# Patient Record
Sex: MALE | Race: WHITE | Employment: FULL TIME | URBAN - METROPOLITAN AREA
[De-identification: names, ages, dates, MRNs, and addresses within clinical notes are randomized per-mention and may not be internally consistent; named-entity substitution may affect disease eponyms.]

---

## 2017-04-24 ENCOUNTER — HOSPITAL ENCOUNTER (EMERGENCY)
Age: 47
Discharge: HOME | End: 2017-04-25
Attending: EMERGENCY MEDICINE
Payer: COMMERCIAL

## 2017-04-24 VITALS
BODY MASS INDEX: 30.48 KG/M2 | HEART RATE: 73 BPM | HEIGHT: 72 IN | SYSTOLIC BLOOD PRESSURE: 157 MMHG | TEMPERATURE: 97.5 F | OXYGEN SATURATION: 97 % | WEIGHT: 225 LBS | RESPIRATION RATE: 16 BRPM | DIASTOLIC BLOOD PRESSURE: 96 MMHG

## 2017-04-24 DIAGNOSIS — R10.31 ABDOMINAL PAIN, RIGHT LOWER QUADRANT: Primary | ICD-10-CM

## 2017-04-24 LAB
ALBUMIN SERPL BCP-MCNC: 4.1 G/DL (ref 3.5–5)
ALBUMIN/GLOB SERPL: 1.1 {RATIO} (ref 1.2–3.5)
ALP SERPL-CCNC: 103 U/L (ref 50–136)
ALT SERPL-CCNC: 41 U/L (ref 12–65)
ANION GAP BLD CALC-SCNC: 7 MMOL/L (ref 7–16)
AST SERPL W P-5'-P-CCNC: 17 U/L (ref 15–37)
BASOPHILS # BLD AUTO: 0.1 K/UL (ref 0–0.2)
BASOPHILS # BLD: 1 % (ref 0–2)
BILIRUB SERPL-MCNC: 0.3 MG/DL (ref 0.2–1.1)
BUN SERPL-MCNC: 18 MG/DL (ref 6–23)
CALCIUM SERPL-MCNC: 8.8 MG/DL (ref 8.3–10.4)
CHLORIDE SERPL-SCNC: 106 MMOL/L (ref 98–107)
CO2 SERPL-SCNC: 31 MMOL/L (ref 21–32)
CREAT SERPL-MCNC: 1.24 MG/DL (ref 0.8–1.5)
DIFFERENTIAL METHOD BLD: ABNORMAL
EOSINOPHIL # BLD: 0.4 K/UL (ref 0–0.8)
EOSINOPHIL NFR BLD: 4 % (ref 0.5–7.8)
ERYTHROCYTE [DISTWIDTH] IN BLOOD BY AUTOMATED COUNT: 12.9 % (ref 11.9–14.6)
GLOBULIN SER CALC-MCNC: 3.6 G/DL (ref 2.3–3.5)
GLUCOSE SERPL-MCNC: 129 MG/DL (ref 65–100)
HCT VFR BLD AUTO: 47.2 % (ref 41.1–50.3)
HGB BLD-MCNC: 16.2 G/DL (ref 13.6–17.2)
IMM GRANULOCYTES # BLD: 0 K/UL (ref 0–0.5)
IMM GRANULOCYTES NFR BLD AUTO: 0.2 % (ref 0–5)
LYMPHOCYTES # BLD AUTO: 30 % (ref 13–44)
LYMPHOCYTES # BLD: 2.6 K/UL (ref 0.5–4.6)
MCH RBC QN AUTO: 30.9 PG (ref 26.1–32.9)
MCHC RBC AUTO-ENTMCNC: 34.3 G/DL (ref 31.4–35)
MCV RBC AUTO: 89.9 FL (ref 79.6–97.8)
MONOCYTES # BLD: 0.6 K/UL (ref 0.1–1.3)
MONOCYTES NFR BLD AUTO: 6 % (ref 4–12)
NEUTS SEG # BLD: 5.2 K/UL (ref 1.7–8.2)
NEUTS SEG NFR BLD AUTO: 59 % (ref 43–78)
PLATELET # BLD AUTO: 255 K/UL (ref 150–450)
PMV BLD AUTO: 10.5 FL (ref 10.8–14.1)
POTASSIUM SERPL-SCNC: 3.9 MMOL/L (ref 3.5–5.1)
PROT SERPL-MCNC: 7.7 G/DL (ref 6.3–8.2)
RBC # BLD AUTO: 5.25 M/UL (ref 4.23–5.67)
SODIUM SERPL-SCNC: 144 MMOL/L (ref 136–145)
WBC # BLD AUTO: 8.8 K/UL (ref 4.3–11.1)

## 2017-04-24 PROCEDURE — 96375 TX/PRO/DX INJ NEW DRUG ADDON: CPT | Performed by: EMERGENCY MEDICINE

## 2017-04-24 PROCEDURE — 96374 THER/PROPH/DIAG INJ IV PUSH: CPT | Performed by: EMERGENCY MEDICINE

## 2017-04-24 PROCEDURE — 81003 URINALYSIS AUTO W/O SCOPE: CPT | Performed by: EMERGENCY MEDICINE

## 2017-04-24 PROCEDURE — 74011250636 HC RX REV CODE- 250/636: Performed by: EMERGENCY MEDICINE

## 2017-04-24 PROCEDURE — 99283 EMERGENCY DEPT VISIT LOW MDM: CPT | Performed by: EMERGENCY MEDICINE

## 2017-04-24 PROCEDURE — 85025 COMPLETE CBC W/AUTO DIFF WBC: CPT | Performed by: EMERGENCY MEDICINE

## 2017-04-24 PROCEDURE — 80053 COMPREHEN METABOLIC PANEL: CPT | Performed by: EMERGENCY MEDICINE

## 2017-04-24 RX ORDER — KETOROLAC TROMETHAMINE 30 MG/ML
30 INJECTION, SOLUTION INTRAMUSCULAR; INTRAVENOUS
Status: COMPLETED | OUTPATIENT
Start: 2017-04-24 | End: 2017-04-24

## 2017-04-24 RX ORDER — ONDANSETRON 2 MG/ML
4 INJECTION INTRAMUSCULAR; INTRAVENOUS
Status: COMPLETED | OUTPATIENT
Start: 2017-04-24 | End: 2017-04-24

## 2017-04-24 RX ADMIN — KETOROLAC TROMETHAMINE 30 MG: 30 INJECTION, SOLUTION INTRAMUSCULAR; INTRAVENOUS at 22:21

## 2017-04-24 RX ADMIN — ONDANSETRON 4 MG: 2 INJECTION, SOLUTION INTRAMUSCULAR; INTRAVENOUS at 22:21

## 2017-04-25 ENCOUNTER — APPOINTMENT (OUTPATIENT)
Dept: CT IMAGING | Age: 47
End: 2017-04-25
Attending: EMERGENCY MEDICINE
Payer: COMMERCIAL

## 2017-04-25 PROCEDURE — 74176 CT ABD & PELVIS W/O CONTRAST: CPT

## 2017-04-25 NOTE — ED TRIAGE NOTES
Patient states that he has right flank pain starting about 30 minutes. Patient states that he had this same pain a few weeks ago. Patient states that he can go to the bathroom but the pain was radiating to his back.

## 2017-04-25 NOTE — DISCHARGE INSTRUCTIONS

## 2017-04-25 NOTE — ED PROVIDER NOTES
HPI Comments: Patient had sudden onset of right lower quadrant pain approximately 30 minutes prior to arrival.  He describes it as achy pain and occasionally seems to radiate to his back. He denies any aggravating or alleviating factors though he says it does feel slightly better when he pushes on the area. He denies any urinary symptoms or fever or vomiting. He did have some nausea earlier. He states 2 weeks ago he felt like he passed a kidney stone though he has never had a history of kidney stones before. Elements of this note were created using speech recognition software. As such, errors of speech recognition may be present. Patient is a 48 y.o. male presenting with flank pain. The history is provided by the patient. Flank Pain    Pertinent negatives include no fever. Past Medical History:   Diagnosis Date    GERD (gastroesophageal reflux disease)     rare takes an occassional tums    History of staph infection right leg   6 months ago    tx with oral antibiotics    Hypertension     medication not being taken at present, patient stopped    Obesity (BMI 41-93.6) 64.8    Umbilical hernia        Past Surgical History:   Procedure Laterality Date    HX CARPAL TUNNEL RELEASE Right     HX HEENT      wisdom teeth ext    HX HERNIA REPAIR       umbilical 2096    HX HERNIA REPAIR Bilateral     HX ORTHOPAEDIC Right 1986    right carpal tunnel    HX WISDOM TEETH EXTRACTION           History reviewed. No pertinent family history. Social History     Social History    Marital status:      Spouse name: N/A    Number of children: N/A    Years of education: N/A     Occupational History    Not on file.      Social History Main Topics    Smoking status: Never Smoker    Smokeless tobacco: Current User    Alcohol use Yes      Comment: social    Drug use: No    Sexual activity: Not on file     Other Topics Concern    Not on file     Social History Narrative         ALLERGIES: Colleen Thompson [hydrocodone-acetaminophen]    Review of Systems   Constitutional: Negative for chills and fever. Gastrointestinal: Negative for nausea and vomiting. Genitourinary: Positive for flank pain. All other systems reviewed and are negative. Vitals:    04/24/17 2210   BP: (!) 157/96   Pulse: 73   Resp: 16   Temp: 97.5 °F (36.4 °C)   SpO2: 97%   Weight: 102.1 kg (225 lb)   Height: 6' (1.829 m)            Physical Exam   Constitutional: He is oriented to person, place, and time. He appears well-developed and well-nourished. HENT:   Head: Normocephalic and atraumatic. Eyes: Conjunctivae are normal. Pupils are equal, round, and reactive to light. Neck: Normal range of motion. Neck supple. Abdominal: Soft. Bowel sounds are normal. He exhibits no distension. There is no tenderness. Musculoskeletal: He exhibits no edema or tenderness. Neurological: He is alert and oriented to person, place, and time. Skin: Skin is warm and dry. Psychiatric: He has a normal mood and affect. His behavior is normal.   Nursing note and vitals reviewed. MDM  Number of Diagnoses or Management Options  Abdominal pain, right lower quadrant: new and does not require workup  Diagnosis management comments: differential diagnosis: renal colic, appendicitis, colitis, urinary tract infection  1:47 AM discussed results with patient, and normal CT findings.   Repeat abdominal exam is unremarkable and he appears comfortable       Amount and/or Complexity of Data Reviewed  Clinical lab tests: ordered and reviewed  Tests in the radiology section of CPT®: ordered and reviewed  Independent visualization of images, tracings, or specimens: yes    Risk of Complications, Morbidity, and/or Mortality  Presenting problems: moderate  Diagnostic procedures: moderate  Management options: moderate    Patient Progress  Patient progress: stable    ED Course       Procedures

## 2020-01-13 ENCOUNTER — APPOINTMENT (OUTPATIENT)
Dept: GENERAL RADIOLOGY | Age: 50
End: 2020-01-13
Attending: EMERGENCY MEDICINE
Payer: COMMERCIAL

## 2020-01-13 ENCOUNTER — HOSPITAL ENCOUNTER (EMERGENCY)
Age: 50
Discharge: HOME | End: 2020-01-14
Attending: EMERGENCY MEDICINE
Payer: COMMERCIAL

## 2020-01-13 DIAGNOSIS — R00.0 SINUS TACHYCARDIA: Primary | ICD-10-CM

## 2020-01-13 DIAGNOSIS — I10 HYPERTENSION, UNSPECIFIED TYPE: ICD-10-CM

## 2020-01-13 LAB
ALBUMIN SERPL-MCNC: 3.6 G/DL (ref 3.5–5)
ALBUMIN/GLOB SERPL: 1 {RATIO} (ref 1.2–3.5)
ALP SERPL-CCNC: 118 U/L (ref 50–136)
ALT SERPL-CCNC: 36 U/L (ref 12–65)
ANION GAP SERPL CALC-SCNC: 7 MMOL/L (ref 7–16)
AST SERPL-CCNC: 20 U/L (ref 15–37)
BACTERIA URNS QL MICRO: 0 /HPF
BASOPHILS # BLD: 0.1 K/UL (ref 0–0.2)
BASOPHILS NFR BLD: 1 % (ref 0–2)
BILIRUB SERPL-MCNC: 0.2 MG/DL (ref 0.2–1.1)
BUN SERPL-MCNC: 13 MG/DL (ref 6–23)
CALCIUM SERPL-MCNC: 8.9 MG/DL (ref 8.3–10.4)
CASTS URNS QL MICRO: NORMAL /LPF
CHLORIDE SERPL-SCNC: 109 MMOL/L (ref 98–107)
CO2 SERPL-SCNC: 24 MMOL/L (ref 21–32)
CREAT SERPL-MCNC: 1.35 MG/DL (ref 0.8–1.5)
D DIMER PPP FEU-MCNC: <0.27 UG/ML(FEU)
DIFFERENTIAL METHOD BLD: NORMAL
EOSINOPHIL # BLD: 0.3 K/UL (ref 0–0.8)
EOSINOPHIL NFR BLD: 4 % (ref 0.5–7.8)
EPI CELLS #/AREA URNS HPF: NORMAL /HPF
ERYTHROCYTE [DISTWIDTH] IN BLOOD BY AUTOMATED COUNT: 12.3 % (ref 11.9–14.6)
GLOBULIN SER CALC-MCNC: 3.5 G/DL (ref 2.3–3.5)
GLUCOSE SERPL-MCNC: 157 MG/DL (ref 65–100)
HCT VFR BLD AUTO: 45.4 % (ref 41.1–50.3)
HGB BLD-MCNC: 15.4 G/DL (ref 13.6–17.2)
IMM GRANULOCYTES # BLD AUTO: 0 K/UL (ref 0–0.5)
IMM GRANULOCYTES NFR BLD AUTO: 1 % (ref 0–5)
LYMPHOCYTES # BLD: 2 K/UL (ref 0.5–4.6)
LYMPHOCYTES NFR BLD: 24 % (ref 13–44)
MCH RBC QN AUTO: 30.3 PG (ref 26.1–32.9)
MCHC RBC AUTO-ENTMCNC: 33.9 G/DL (ref 31.4–35)
MCV RBC AUTO: 89.4 FL (ref 79.6–97.8)
MONOCYTES # BLD: 0.7 K/UL (ref 0.1–1.3)
MONOCYTES NFR BLD: 8 % (ref 4–12)
NEUTS SEG # BLD: 5.1 K/UL (ref 1.7–8.2)
NEUTS SEG NFR BLD: 63 % (ref 43–78)
NRBC # BLD: 0 K/UL (ref 0–0.2)
PLATELET # BLD AUTO: 270 K/UL (ref 150–450)
PMV BLD AUTO: 9.7 FL (ref 9.4–12.3)
POTASSIUM SERPL-SCNC: 3.4 MMOL/L (ref 3.5–5.1)
PROT SERPL-MCNC: 7.1 G/DL (ref 6.3–8.2)
RBC # BLD AUTO: 5.08 M/UL (ref 4.23–5.6)
RBC #/AREA URNS HPF: 0 /HPF
SODIUM SERPL-SCNC: 140 MMOL/L (ref 136–145)
TROPONIN I BLD-MCNC: 0 NG/ML (ref 0.02–0.05)
WBC # BLD AUTO: 8.2 K/UL (ref 4.3–11.1)
WBC URNS QL MICRO: NORMAL /HPF

## 2020-01-13 PROCEDURE — 71045 X-RAY EXAM CHEST 1 VIEW: CPT

## 2020-01-13 PROCEDURE — 81003 URINALYSIS AUTO W/O SCOPE: CPT | Performed by: EMERGENCY MEDICINE

## 2020-01-13 PROCEDURE — 74011000250 HC RX REV CODE- 250: Performed by: EMERGENCY MEDICINE

## 2020-01-13 PROCEDURE — 74011250636 HC RX REV CODE- 250/636: Performed by: EMERGENCY MEDICINE

## 2020-01-13 PROCEDURE — 93005 ELECTROCARDIOGRAM TRACING: CPT | Performed by: EMERGENCY MEDICINE

## 2020-01-13 PROCEDURE — 84484 ASSAY OF TROPONIN QUANT: CPT

## 2020-01-13 PROCEDURE — 81015 MICROSCOPIC EXAM OF URINE: CPT

## 2020-01-13 PROCEDURE — 85379 FIBRIN DEGRADATION QUANT: CPT

## 2020-01-13 PROCEDURE — 85025 COMPLETE CBC W/AUTO DIFF WBC: CPT

## 2020-01-13 PROCEDURE — 99285 EMERGENCY DEPT VISIT HI MDM: CPT | Performed by: EMERGENCY MEDICINE

## 2020-01-13 PROCEDURE — 80053 COMPREHEN METABOLIC PANEL: CPT

## 2020-01-13 PROCEDURE — 96374 THER/PROPH/DIAG INJ IV PUSH: CPT | Performed by: EMERGENCY MEDICINE

## 2020-01-13 RX ORDER — SODIUM CHLORIDE 9 MG/ML
1000 INJECTION, SOLUTION INTRAVENOUS ONCE
Status: COMPLETED | OUTPATIENT
Start: 2020-01-13 | End: 2020-01-14

## 2020-01-13 RX ORDER — METOPROLOL TARTRATE 5 MG/5ML
5 INJECTION INTRAVENOUS
Status: DISPENSED | OUTPATIENT
Start: 2020-01-13 | End: 2020-01-13

## 2020-01-13 RX ADMIN — METOPROLOL TARTRATE 5 MG: 5 INJECTION INTRAVENOUS at 23:11

## 2020-01-13 RX ADMIN — SODIUM CHLORIDE 1000 ML: 900 INJECTION, SOLUTION INTRAVENOUS at 22:56

## 2020-01-13 RX ADMIN — METOPROLOL TARTRATE 5 MG: 5 INJECTION INTRAVENOUS at 23:32

## 2020-01-14 VITALS
OXYGEN SATURATION: 96 % | HEIGHT: 73 IN | SYSTOLIC BLOOD PRESSURE: 143 MMHG | WEIGHT: 228 LBS | RESPIRATION RATE: 23 BRPM | HEART RATE: 87 BPM | TEMPERATURE: 98.5 F | BODY MASS INDEX: 30.22 KG/M2 | DIASTOLIC BLOOD PRESSURE: 92 MMHG

## 2020-01-14 LAB
ATRIAL RATE: 119 BPM
CALCULATED P AXIS, ECG09: 47 DEGREES
CALCULATED R AXIS, ECG10: 48 DEGREES
CALCULATED T AXIS, ECG11: 33 DEGREES
DIAGNOSIS, 93000: NORMAL
P-R INTERVAL, ECG05: 160 MS
Q-T INTERVAL, ECG07: 318 MS
QRS DURATION, ECG06: 100 MS
QTC CALCULATION (BEZET), ECG08: 447 MS
TROPONIN I SERPL-MCNC: <0.02 NG/ML (ref 0.02–0.05)
TSH SERPL DL<=0.005 MIU/L-ACNC: 3.39 UIU/ML
VENTRICULAR RATE, ECG03: 119 BPM

## 2020-01-14 PROCEDURE — 84443 ASSAY THYROID STIM HORMONE: CPT

## 2020-01-14 PROCEDURE — 84484 ASSAY OF TROPONIN QUANT: CPT

## 2020-01-14 RX ORDER — METOPROLOL TARTRATE 25 MG/1
25 TABLET, FILM COATED ORAL 2 TIMES DAILY
Qty: 60 TAB | Refills: 0 | Status: SHIPPED | OUTPATIENT
Start: 2020-01-14

## 2020-01-14 NOTE — ED PROVIDER NOTES
The history is provided by the patient. Palpitations    This is a new problem. The current episode started 3 to 5 hours ago. The problem has not changed since onset. The problem occurs constantly. The problem is associated with nothing. Associated symptoms include headaches. Pertinent negatives include no diaphoresis, no fever, no malaise/fatigue, no numbness, no chest pain, no chest pressure, no exertional chest pressure, no near-syncope, no syncope, no abdominal pain, no nausea, no back pain, no leg pain, no lower extremity edema, no weakness, no cough, no hemoptysis, no shortness of breath and no sputum production. Risk factors include no risk factors. He has tried nothing for the symptoms. Past medical history comments: Palpitations and elevated heart rate in the past but it was related to stress which he currently does not feel like he is experiencing. Past Medical History:   Diagnosis Date    GERD (gastroesophageal reflux disease)     rare takes an occassional tums    History of staph infection right leg   6 months ago    tx with oral antibiotics    Hypertension     medication not being taken at present, patient stopped    Obesity (BMI 45-59.7) 90.4    Umbilical hernia        Past Surgical History:   Procedure Laterality Date    HX CARPAL TUNNEL RELEASE Right     HX HEENT      wisdom teeth ext    HX HERNIA REPAIR       umbilical 3732    HX HERNIA REPAIR Bilateral     HX ORTHOPAEDIC Right 1986    right carpal tunnel    HX WISDOM TEETH EXTRACTION           No family history on file.     Social History     Socioeconomic History    Marital status:      Spouse name: Not on file    Number of children: Not on file    Years of education: Not on file    Highest education level: Not on file   Occupational History    Not on file   Social Needs    Financial resource strain: Not on file    Food insecurity:     Worry: Not on file     Inability: Not on file    Transportation needs: Medical: Not on file     Non-medical: Not on file   Tobacco Use    Smoking status: Never Smoker    Smokeless tobacco: Current User   Substance and Sexual Activity    Alcohol use: Yes     Comment: social    Drug use: No    Sexual activity: Not on file   Lifestyle    Physical activity:     Days per week: Not on file     Minutes per session: Not on file    Stress: Not on file   Relationships    Social connections:     Talks on phone: Not on file     Gets together: Not on file     Attends Christianity service: Not on file     Active member of club or organization: Not on file     Attends meetings of clubs or organizations: Not on file     Relationship status: Not on file    Intimate partner violence:     Fear of current or ex partner: Not on file     Emotionally abused: Not on file     Physically abused: Not on file     Forced sexual activity: Not on file   Other Topics Concern    Not on file   Social History Narrative    Not on file         ALLERGIES: Norco [hydrocodone-acetaminophen]    Review of Systems   Constitutional: Negative for diaphoresis, fever and malaise/fatigue. HENT: Negative for congestion and rhinorrhea. Respiratory: Negative for cough, hemoptysis, sputum production and shortness of breath. Cardiovascular: Positive for palpitations. Negative for chest pain, syncope and near-syncope. Gastrointestinal: Negative for abdominal pain, blood in stool and nausea. Endocrine: Negative for polyuria. Genitourinary: Negative for dysuria, frequency and hematuria. Musculoskeletal: Negative for back pain and neck pain. Neurological: Positive for headaches. Negative for weakness and numbness. Psychiatric/Behavioral: Negative for dysphoric mood. The patient is not nervous/anxious.         Vitals:    01/14/20 0004 01/14/20 0027 01/14/20 0030 01/14/20 0100   BP:   141/76 (!) 143/92   Pulse: 92 89 88 93   Resp: 10 14 (!) 34 15   Temp:       SpO2: 95% 94% 95% 95%   Weight:       Height: Physical Exam  Vitals signs and nursing note reviewed. Constitutional:       Appearance: He is well-developed. HENT:      Mouth/Throat:      Pharynx: No oropharyngeal exudate. Eyes:      Conjunctiva/sclera: Conjunctivae normal.      Pupils: Pupils are equal, round, and reactive to light. Neck:      Musculoskeletal: Neck supple. Cardiovascular:      Rate and Rhythm: Regular rhythm. Tachycardia present. Pulses:           Carotid pulses are 2+ on the right side and 2+ on the left side. Radial pulses are 2+ on the right side and 2+ on the left side. Heart sounds: Normal heart sounds. No murmur. Pulmonary:      Effort: Pulmonary effort is normal.      Breath sounds: Normal breath sounds. Abdominal:      General: Bowel sounds are normal. There is no distension. Palpations: Abdomen is soft. Tenderness: There is no tenderness. There is no guarding or rebound. Musculoskeletal: Normal range of motion. General: No tenderness. Lymphadenopathy:      Cervical: No cervical adenopathy. Skin:     General: Skin is warm and dry. Neurological:      Mental Status: He is alert and oriented to person, place, and time. MDM  Number of Diagnoses or Management Options  Diagnosis management comments: Patient's work-up is negative. I can find no cause for the sinus tachycardia. He has had this in the past but it was associated with stress which he denies now. 2 troponins are normal as is a d-dimer. No anemia. No signs of infection. Blood pressure was elevated and the metoprolol has been given which is improved both his heart rate and blood pressure. Will discharge on metoprolol and have him follow-up with PCP for blood pressure and heart rate recheck. Return if any new, worsening or concerning symptoms. TSH was normal as well.        Amount and/or Complexity of Data Reviewed  Clinical lab tests: ordered and reviewed (Results for orders placed or performed during the hospital encounter of 01/13/20  -CBC WITH AUTOMATED DIFF       Result                      Value             Ref Range           WBC                         8.2               4.3 - 11.1 K*       RBC                         5.08              4.23 - 5.6 M*       HGB                         15.4              13.6 - 17.2 *       HCT                         45.4              41.1 - 50.3 %       MCV                         89.4              79.6 - 97.8 *       MCH                         30.3              26.1 - 32.9 *       MCHC                        33.9              31.4 - 35.0 *       RDW                         12.3              11.9 - 14.6 %       PLATELET                    270               150 - 450 K/*       MPV                         9.7               9.4 - 12.3 FL       ABSOLUTE NRBC               0.00              0.0 - 0.2 K/*       DF                          AUTOMATED                             NEUTROPHILS                 63                43 - 78 %           LYMPHOCYTES                 24                13 - 44 %           MONOCYTES                   8                 4.0 - 12.0 %        EOSINOPHILS                 4                 0.5 - 7.8 %         BASOPHILS                   1                 0.0 - 2.0 %         IMMATURE GRANULOCYTES       1                 0.0 - 5.0 %         ABS. NEUTROPHILS            5.1               1.7 - 8.2 K/*       ABS. LYMPHOCYTES            2.0               0.5 - 4.6 K/*       ABS. MONOCYTES              0.7               0.1 - 1.3 K/*       ABS. EOSINOPHILS            0.3               0.0 - 0.8 K/*       ABS. BASOPHILS              0.1               0.0 - 0.2 K/*       ABS. IMM.  GRANS.            0.0               0.0 - 0.5 K/*  -METABOLIC PANEL, COMPREHENSIVE       Result                      Value             Ref Range           Sodium                      140               136 - 145 mm*       Potassium                   3.4 (L)           3.5 - 5.1 mm* Chloride                    109 (H)           98 - 107 mmo*       CO2                         24                21 - 32 mmol*       Anion gap                   7                 7 - 16 mmol/L       Glucose                     157 (H)           65 - 100 mg/*       BUN                         13                6 - 23 MG/DL        Creatinine                  1.35              0.8 - 1.5 MG*       GFR est AA                  >60               >60 ml/min/1*       GFR est non-AA              59 (L)            >60 ml/min/1*       Calcium                     8.9               8.3 - 10.4 M*       Bilirubin, total            0.2               0.2 - 1.1 MG*       ALT (SGPT)                  36                12 - 65 U/L         AST (SGOT)                  20                15 - 37 U/L         Alk.  phosphatase            118               50 - 136 U/L        Protein, total              7.1               6.3 - 8.2 g/*       Albumin                     3.6               3.5 - 5.0 g/*       Globulin                    3.5               2.3 - 3.5 g/*       A-G Ratio                   1.0 (L)           1.2 - 3.5      -D DIMER       Result                      Value             Ref Range           D DIMER                     <0.27             <0.56 ug/ml(*  -URINE MICROSCOPIC       Result                      Value             Ref Range           WBC                         3-5               0 /hpf              RBC                         0                 0 /hpf              Epithelial cells            0-3               0 /hpf              Bacteria                    0                 0 /hpf              Casts                       0-3               0 /lpf         -TSH 3RD GENERATION       Result                      Value             Ref Range           TSH                         3.390             uIU/mL         -TROPONIN I       Result                      Value             Ref Range           Troponin-I, Qt.             <0.02 (L) 0.02 - 0.05 *  -POC TROPONIN       Result                      Value             Ref Range           Troponin-I (POC)            0 (L)             0.02 - 0.05 *  -EKG, 12 LEAD, INITIAL       Result                      Value             Ref Range           Ventricular Rate            119               BPM                 Atrial Rate                 119               BPM                 P-R Interval                160               ms                  QRS Duration                100               ms                  Q-T Interval                318               ms                  QTC Calculation (Bezet)     447               ms                  Calculated P Axis           47                degrees             Calculated R Axis           48                degrees             Calculated T Axis           33                degrees             Diagnosis                                                     !! AGE AND GENDER SPECIFIC ECG ANALYSIS !! Sinus tachycardia   Otherwise normal ECG   No previous ECGs available     )  Tests in the radiology section of CPT®: ordered and reviewed (Xr Chest Port    Result Date: 1/13/2020  AP PORTABLE CHEST X-RAY 1/13/2020 10:03 PM HISTORY: chest pain COMPARISON: None FINDINGS:  The lung volumes are diminished. EKG leads are present. There is no lobar consolidation, pleural effusions or pulmonary edema.      IMPRESSION: Low lung volumes.    )           Procedures

## 2020-01-14 NOTE — DISCHARGE INSTRUCTIONS
Patient Education      Metoprolol starting in the morning and twice daily for 1 to 2 weeks. Follow-up with your primary care doctor later this week for blood pressure and heart rate recheck. Follow-up for further evaluation. Return here if any new, worsening or concerning symptoms occur. High Blood Pressure: Care Instructions  Overview    It's normal for blood pressure to go up and down throughout the day. But if it stays up, you have high blood pressure. Another name for high blood pressure is hypertension. Despite what a lot of people think, high blood pressure usually doesn't cause headaches or make you feel dizzy or lightheaded. It usually has no symptoms. But it does increase your risk of stroke, heart attack, and other problems. You and your doctor will talk about your risks of these problems based on your blood pressure. Your doctor will give you a goal for your blood pressure. Your goal will be based on your health and your age. Lifestyle changes, such as eating healthy and being active, are always important to help lower blood pressure. You might also take medicine to reach your blood pressure goal.  Follow-up care is a key part of your treatment and safety. Be sure to make and go to all appointments, and call your doctor if you are having problems. It's also a good idea to know your test results and keep a list of the medicines you take. How can you care for yourself at home? Medical treatment  · If you stop taking your medicine, your blood pressure will go back up. You may take one or more types of medicine to lower your blood pressure. Be safe with medicines. Take your medicine exactly as prescribed. Call your doctor if you think you are having a problem with your medicine. · Talk to your doctor before you start taking aspirin every day. Aspirin can help certain people lower their risk of a heart attack or stroke.  But taking aspirin isn't right for everyone, because it can cause serious bleeding. · See your doctor regularly. You may need to see the doctor more often at first or until your blood pressure comes down. · If you are taking blood pressure medicine, talk to your doctor before you take decongestants or anti-inflammatory medicine, such as ibuprofen. Some of these medicines can raise blood pressure. · Learn how to check your blood pressure at home. Lifestyle changes  · Stay at a healthy weight. This is especially important if you put on weight around the waist. Losing even 10 pounds can help you lower your blood pressure. · If your doctor recommends it, get more exercise. Walking is a good choice. Bit by bit, increase the amount you walk every day. Try for at least 30 minutes on most days of the week. You also may want to swim, bike, or do other activities. · Avoid or limit alcohol. Talk to your doctor about whether you can drink any alcohol. · Try to limit how much sodium you eat to less than 2,300 milligrams (mg) a day. Your doctor may ask you to try to eat less than 1,500 mg a day. · Eat plenty of fruits (such as bananas and oranges), vegetables, legumes, whole grains, and low-fat dairy products. · Lower the amount of saturated fat in your diet. Saturated fat is found in animal products such as milk, cheese, and meat. Limiting these foods may help you lose weight and also lower your risk for heart disease. · Do not smoke. Smoking increases your risk for heart attack and stroke. If you need help quitting, talk to your doctor about stop-smoking programs and medicines. These can increase your chances of quitting for good. When should you call for help? Call  911 anytime you think you may need emergency care. This may mean having symptoms that suggest that your blood pressure is causing a serious heart or blood vessel problem. Your blood pressure may be over 180/120.   For example, call  911 if:    · You have symptoms of a heart attack. These may include:  ?  Chest pain or pressure, or a strange feeling in the chest.  ? Sweating. ? Shortness of breath. ? Nausea or vomiting. ? Pain, pressure, or a strange feeling in the back, neck, jaw, or upper belly or in one or both shoulders or arms. ? Lightheadedness or sudden weakness. ? A fast or irregular heartbeat.     · You have symptoms of a stroke. These may include:  ? Sudden numbness, tingling, weakness, or loss of movement in your face, arm, or leg, especially on only one side of your body. ? Sudden vision changes. ? Sudden trouble speaking. ? Sudden confusion or trouble understanding simple statements. ? Sudden problems with walking or balance. ? A sudden, severe headache that is different from past headaches.     · You have severe back or belly pain.    Do not wait until your blood pressure comes down on its own. Get help right away.   Call your doctor now or seek immediate care if:    · Your blood pressure is much higher than normal (such as 180/120 or higher), but you don't have symptoms.     · You think high blood pressure is causing symptoms, such as:  ? Severe headache.  ? Blurry vision.    Watch closely for changes in your health, and be sure to contact your doctor if:    · Your blood pressure measures higher than your doctor recommends at least 2 times. That means the top number is higher or the bottom number is higher, or both.     · You think you may be having side effects from your blood pressure medicine. Where can you learn more? Go to http://frannie-ramon.info/. Enter P567 in the search box to learn more about \"High Blood Pressure: Care Instructions. \"  Current as of: April 9, 2019  Content Version: 12.2  © 7506-7312 [employer]. Care instructions adapted under license by [employer] (which disclaims liability or warranty for this information).  If you have questions about a medical condition or this instruction, always ask your healthcare professional. [employer] disclaims any warranty or liability for your use of this information.

## 2020-01-14 NOTE — ED NOTES
I have reviewed discharge instructions with the patient. The patient verbalized understanding. Patient left ED via Discharge Method: ambulatory to Home with  wife). Opportunity for questions and clarification provided. Patient given 1 scripts. To continue your aftercare when you leave the hospital, you may receive an automated call from our care team to check in on how you are doing. This is a free service and part of our promise to provide the best care and service to meet your aftercare needs.  If you have questions, or wish to unsubscribe from this service please call [phone]. Thank you for Choosing our [facility] Emergency Department.